# Patient Record
Sex: FEMALE | Race: WHITE | Employment: OTHER | URBAN - NONMETROPOLITAN AREA
[De-identification: names, ages, dates, MRNs, and addresses within clinical notes are randomized per-mention and may not be internally consistent; named-entity substitution may affect disease eponyms.]

---

## 2019-07-30 ENCOUNTER — OFFICE VISIT (OUTPATIENT)
Dept: OTOLARYNGOLOGY | Facility: OTHER | Age: 49
End: 2019-07-30
Attending: OTOLARYNGOLOGY
Payer: COMMERCIAL

## 2019-07-30 ENCOUNTER — TRANSFERRED RECORDS (OUTPATIENT)
Dept: HEALTH INFORMATION MANAGEMENT | Facility: CLINIC | Age: 49
End: 2019-07-30

## 2019-07-30 DIAGNOSIS — J35.01 CHRONIC TONSILLITIS: Primary | ICD-10-CM

## 2019-07-30 PROCEDURE — G0463 HOSPITAL OUTPT CLINIC VISIT: HCPCS

## 2019-08-05 NOTE — PROGRESS NOTES
document embedded image  Patient Name:  Francine Chang  YOB: 1970  MR Number:  [id number]  Acct Number: [id number]    cc: ~    ENT Progress Note    Date: 07/30/19    Visit Reasons: Recheck Throat/Tiinitus Left    HPI  HPI  HPI: Chief complaint:  Chronic pain left tonsil    History  The patient is a 49-year-old female who is here today for follow-up of chronic discomfort in her left tonsil.  She states she occasionally has a sense of foreign body in the tonsil.  She was seen several months ago by Dr. Goode was felt to have some tonsillar asymmetry.    Exam  Oral cavity oropharynx-I cannot perceive any tonsillar asymmetry persisting at this time.  There is no acute inflammation of the tonsils.  Her tonsils are cryptic.  Bimanual exam-there is no palpable mass in either tonsillar fossa  Neck-no masses or adenopathy  Nasal-no obstruction or purulence  Head and neck integument-Clear  Indirect laryngoscopy-Clear hypopharynx and larynx  General-the patient appears well and in no distress  Neuro-there are no focal cranial nerve deficits    Home Medications    No Home Medications  05/16/19 [History Confirmed 07/31/19]    Allergies    Sulfa (Sulfonamide Antibiotics) Allergy (Mild, Verified 07/31/19 09:03)  Rash    PFSH  PFSH  PFSH:   Medical History (Reviewed 07/31/19 @ 09:04 by Michelle Bran CMA)    Headache (Acute)  Tinnitus (Acute)    Surgical History (Reviewed 07/31/19 @ 09:04 by Michelle Bran CMA)    History of kidney stones (Resolved)  2 surgeries  History of oral surgery (Resolved)  wisdom teeth removal      Social History (Reviewed 07/31/19 @ 09:04 by Michelle Bran CMA)  Smoking Status:  Never smoker   second hand exposure:  No   alcohol intake:  never   substance use type:  does not use   seatbelt use:  always   drive intox or ride w/ intox :  No   working smoke detector in home:  Yes   fire extinguisher in home:  Yes   firearms in home:  Yes firearms unloaded and locked:  Yes   do you feel safe at home:  Yes   victim of physical abuse:  No   victim of emotional abuse:  No   victim of sexual abuse:  No   would you like helpful sources:  No        A&P  Assessment & Plan  (1) Chronic tonsillitis:         Code(s):  J35.01 - Chronic tonsillitis        The patient's symptoms have exceeded 3 months.  She is a candidate for tonsil surgery should she wish to proceed.  She is not interested in surgery.  She was reassured that do not have suspicions of a neoplasm at this time.  She will follow up if she has worsening symptoms in the future.      Danny Baptiste MD              07/30/19 1110   <Electronically signed by Danny Baptiste MD> 08/01/19 9925

## 2021-02-18 ENCOUNTER — OFFICE VISIT (OUTPATIENT)
Dept: OTOLARYNGOLOGY | Facility: OTHER | Age: 51
End: 2021-02-18
Attending: OTOLARYNGOLOGY
Payer: COMMERCIAL

## 2021-02-18 VITALS
OXYGEN SATURATION: 98 % | SYSTOLIC BLOOD PRESSURE: 106 MMHG | TEMPERATURE: 97.6 F | BODY MASS INDEX: 25.76 KG/M2 | HEIGHT: 62 IN | WEIGHT: 140 LBS | RESPIRATION RATE: 16 BRPM | HEART RATE: 78 BPM | DIASTOLIC BLOOD PRESSURE: 60 MMHG

## 2021-02-18 DIAGNOSIS — R09.A2 GLOBUS PHARYNGEUS: Primary | ICD-10-CM

## 2021-02-18 DIAGNOSIS — J35.1 TONSILLAR HYPERTROPHY: ICD-10-CM

## 2021-02-18 DIAGNOSIS — E04.9 THYROID GOITER: ICD-10-CM

## 2021-02-18 PROCEDURE — 31575 DIAGNOSTIC LARYNGOSCOPY: CPT | Performed by: OTOLARYNGOLOGY

## 2021-02-18 PROCEDURE — 99203 OFFICE O/P NEW LOW 30 MIN: CPT | Mod: 25 | Performed by: OTOLARYNGOLOGY

## 2021-02-18 SDOH — HEALTH STABILITY: MENTAL HEALTH: HOW OFTEN DO YOU HAVE A DRINK CONTAINING ALCOHOL?: NEVER

## 2021-02-18 SDOH — HEALTH STABILITY: MENTAL HEALTH: HOW OFTEN DO YOU HAVE 6 OR MORE DRINKS ON ONE OCCASION?: NEVER

## 2021-02-18 SDOH — HEALTH STABILITY: MENTAL HEALTH: HOW MANY STANDARD DRINKS CONTAINING ALCOHOL DO YOU HAVE ON A TYPICAL DAY?: NOT ASKED

## 2021-02-18 ASSESSMENT — MIFFLIN-ST. JEOR: SCORE: 1208.29

## 2021-02-18 ASSESSMENT — PAIN SCALES - GENERAL: PAINLEVEL: MILD PAIN (2)

## 2021-02-18 NOTE — PROGRESS NOTES
Otolaryngology Consultation    Patient: Francine Chang  : 1970    Patient presents with:  Consult: Throat Issue, Nasal Sore; Self Referral      HPI:  Francine Chang is a 50 year old female seen today for evaluation of her throat.    She initially had a severe sore throat on the left 2 years ago.  Since that time she has a left lower throat globus described like a sunflower seed being stuck in her throat  She points to the hyoid/thyroihoid area on left    She has also noted a left nasal sore    Associated left pulsatile tinnitus, present since 2016 Lyme dz  Recent post coital migraine no chronic migraine    She has seen Dr. Baptiste in the distant past dated 2019 this note was reviewed she has chronic left tonsil pain and globus she is initially seen in the distant past by Dr. Goode who noted tonsillar asymmetry.  On repeat exam by Dr. Baptiste there is no asymmetry noted cryptic tonsils were found without mass he did discuss tonsillectomy but she did not wish to proceed    No weight loss  No tobacco  Rare gastroesophageal reflux disease  No dysphonia  No dysphagia    She is a      Family hx of thyroid disease, no known family hx of thyroid cancer    She states she has had several prior XR and does not wish to have any radiation exposure with imaging if possible        No current outpatient medications on file.       Allergies: Patient has no known allergies.     No past medical history on file.    No past surgical history on file.    ENT family history reviewed    Social History     Tobacco Use     Smoking status: Never Smoker     Smokeless tobacco: Never Used   Substance Use Topics     Alcohol use: Never     Frequency: Never     Binge frequency: Never     Drug use: None       Review of Systems  ROS: 10 point ROS neg other than the symptoms noted above in the HPI and tinnitus, neck pain    Physical Exam  /60   Pulse 78   Temp 97.6  F (36.4  C) (Tympanic)   Resp 16   Ht  "1.575 m (5' 2\")   Wt 63.5 kg (140 lb)   SpO2 98%   BMI 25.61 kg/m    General - The patient is well nourished and well developed, and appears to have good nutritional status.  Alert and oriented to person and place, answers questions and cooperates with examination appropriately.   Head and Face - Normocephalic and atraumatic, with no gross asymmetry noted.  The facial nerve is intact, with strong symmetric movements.  Voice and Breathing - The patient was breathing comfortably without the use of accessory muscles. There was no wheezing, stridor, or stertor.  The patients voice was clear and strong, and had appropriate pitch and quality.  No lupe peripheral digital clubbing or cyanosis   Ears -The external auditory canals are patent, the tympanic membranes are intact without effusion, retraction or mass.  Bony landmarks are intact.  Eyes - Extraocular movements intact, and the pupils were reactive to light.  Sclera were not icteric or injected, conjunctiva were pink and moist.  Mouth - Examination of the oral cavity showed pink, healthy oral mucosa. No lesions or ulcerations noted.  The tongue was mobile and midline, and the dentition were in good condition.    Throat - The walls of the oropharynx were smooth, pink, moist, symmetric, and had no lesions or ulcerations.  The tonsillar pillars and soft palate were symmetric.  The uvula was midline on elevation.  Grade 3 symmetric tonsils, no asymmetry or mass, tongue base symmetric on palpation  Neck - reproducible globus at left thyrohoid and left hyoid.  No palpable enlarged fixed cervical lymph nodes.  No neck cysts or unusual tenderness to palpation.   No palpable fixed thyroid nodules , bilateral nontender multinodular small goiter.  The trachea is grossly midline.   Nose - External contour is symmetric, no gross deflection or scars.  Nasal mucosa is pink and moist with no abnormal mucus.  Bilateral caudal septum slight crusting. The septum and turbinates were " evaluated: no lupe obstruction.  No polyps, masses, or purulence noted on examination.      Attempts at mirror laryngoscopy were not possible due to gag reflex.  Therefore I proceeded with a fiberoptic examination after informed consent.  First I applied topical nasal lidocaine and neosynephrine.  I then passed the scope through the nasal cavity.     The nasopharynx was mucosally covered and symmetric.  The eustachian tube openings were unobstructed.  Going further down I had a clear view of the base of tongue which had normal appearing grade 2 lingual tonsillar tissue.  The base of tongue was free of lesions, and the vallecula was open.  The epiglottis was smooth and mucosally covered.  The supraglottic larynx was then clearly visualized.  The vocal cords moved smoothly and symmetrically and were pearly white.  The pyriform sinuses were open and without lupe mass or pooling of secretions upon valsalva, and the limited view of the postcricoid region did not show any lesions.  The patient tolerated the procedure well.      Impression and Plan- Francine Chang is a 50 year old female with:    ICD-10-CM    1. 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 Thyroid goiter  E04.9 US Thyroid     US Head Neck Soft Tissue   3. Tonsillar hypertrophy  J35.1        Use Ayr Gel to both nostrils twice daily for 1 month, then at bedtime only  Maintain adequate water intake  Decrease Caffeine  Complete Thyroid and Neck Ultrasounds    I discussed options with chronic globus.  I would like her to have a CT neck if her symptoms persist.  Could also consider amitriptyline and/or superior laryngeal nerve block which was discussed today      There is no indication for surgical intervention at this juncture and she is happy to hear this.  Indications for adenotonsillectomy include recurrent tonsillitis (7 infections in one year, 5 infections/year x 2 years, 3 infections/year for 3 years; or lupe sleep apnea or  sleep disordered breathing causing daytime symptoms).    Education provided, anatomy discussed.    Follow up as needed with progressive symptoms.  I told Francine an USFNAB may be recommended based on US              Craol Garcia D.O.  Otolaryngology/Head and Neck Surgery  Allergy

## 2021-02-18 NOTE — NURSING NOTE
"Chief Complaint   Patient presents with     Consult     Throat Issue, Nasal Sore; Self Referral       Initial /60   Pulse 78   Temp 97.6  F (36.4  C) (Tympanic)   Resp 16   Ht 1.575 m (5' 2\")   Wt 63.5 kg (140 lb)   SpO2 98%   BMI 25.61 kg/m   Estimated body mass index is 25.61 kg/m  as calculated from the following:    Height as of this encounter: 1.575 m (5' 2\").    Weight as of this encounter: 63.5 kg (140 lb).  Medication Reconciliation: complete  Dorothy Holliday LPN  "

## 2021-02-18 NOTE — LETTER
2021         RE: Francine Chang  [address] [facility] [address]        Dear Colleague,    Thank you for referring your patient, Francine Chang, to the [facility]. Please see a copy of my visit note below.      Otolaryngology Consultation    Patient: Francine Chang  : 1970    Patient presents with:  Consult: Throat Issue, Nasal Sore; Self Referral      HPI:  Francine Chang is a 50 year old female seen today for evaluation of her throat.    She initially had a severe sore throat on the left 2 years ago.  Since that time she has a left lower throat globus described like a sunflower seed being stuck in her throat  She points to the hyoid/thyroihoid area on left    She has also noted a left nasal sore    Associated left pulsatile tinnitus, present since  Lyme dz  Recent post coital migraine no chronic migraine    She has seen Dr. Baptiste in the distant past dated 2019 this note was reviewed she has chronic left tonsil pain and globus she is initially seen in the distant past by Dr. Goode who noted tonsillar asymmetry.  On repeat exam by Dr. Baptiste there is no asymmetry noted cryptic tonsils were found without mass he did discuss tonsillectomy but she did not wish to proceed    No weight loss  No tobacco  Rare gastroesophageal reflux disease  No dysphonia  No dysphagia    She is a      Family hx of thyroid disease, no known family hx of thyroid cancer    She states she has had several prior XR and does not wish to have any radiation exposure with imaging if possible        No current outpatient medications on file.       Allergies: Patient has no known allergies.     No past medical history on file.    No past surgical history on file.    ENT family history reviewed    Social History     Tobacco Use     Smoking status: Never Smoker     Smokeless tobacco: Never Used   Substance Use Topics     Alcohol use: Never     Frequency: Never     Binge  "frequency: Never     Drug use: None       Review of Systems  ROS: 10 point ROS neg other than the symptoms noted above in the HPI and tinnitus, neck pain    Physical Exam  /60   Pulse 78   Temp 97.6  F (36.4  C) (Tympanic)   Resp 16   Ht 1.575 m (5' 2\")   Wt 63.5 kg (140 lb)   SpO2 98%   BMI 25.61 kg/m    General - The patient is well nourished and well developed, and appears to have good nutritional status.  Alert and oriented to person and place, answers questions and cooperates with examination appropriately.   Head and Face - Normocephalic and atraumatic, with no gross asymmetry noted.  The facial nerve is intact, with strong symmetric movements.  Voice and Breathing - The patient was breathing comfortably without the use of accessory muscles. There was no wheezing, stridor, or stertor.  The patients voice was clear and strong, and had appropriate pitch and quality.  No lupe peripheral digital clubbing or cyanosis   Ears -The external auditory canals are patent, the tympanic membranes are intact without effusion, retraction or mass.  Bony landmarks are intact.  Eyes - Extraocular movements intact, and the pupils were reactive to light.  Sclera were not icteric or injected, conjunctiva were pink and moist.  Mouth - Examination of the oral cavity showed pink, healthy oral mucosa. No lesions or ulcerations noted.  The tongue was mobile and midline, and the dentition were in good condition.    Throat - The walls of the oropharynx were smooth, pink, moist, symmetric, and had no lesions or ulcerations.  The tonsillar pillars and soft palate were symmetric.  The uvula was midline on elevation.  Grade 3 symmetric tonsils, no asymmetry or mass, tongue base symmetric on palpation  Neck - reproducible globus at left thyrohoid and left hyoid.  No palpable enlarged fixed cervical lymph nodes.  No neck cysts or unusual tenderness to palpation.   No palpable fixed thyroid nodules , bilateral nontender " multinodular small goiter.  The trachea is grossly midline.   Nose - External contour is symmetric, no gross deflection or scars.  Nasal mucosa is pink and moist with no abnormal mucus.  Bilateral caudal septum slight crusting. The septum and turbinates were evaluated: no lupe obstruction.  No polyps, masses, or purulence noted on examination.      Attempts at mirror laryngoscopy were not possible due to gag reflex.  Therefore I proceeded with a fiberoptic examination after informed consent.  First I applied topical nasal lidocaine and neosynephrine.  I then passed the scope through the nasal cavity.     The nasopharynx was mucosally covered and symmetric.  The eustachian tube openings were unobstructed.  Going further down I had a clear view of the base of tongue which had normal appearing grade 2 lingual tonsillar tissue.  The base of tongue was free of lesions, and the vallecula was open.  The epiglottis was smooth and mucosally covered.  The supraglottic larynx was then clearly visualized.  The vocal cords moved smoothly and symmetrically and were pearly white.  The pyriform sinuses were open and without lupe mass or pooling of secretions upon valsalva, and the limited view of the postcricoid region did not show any lesions.  The patient tolerated the procedure well.      Impression and Plan- Francine Chang is a 50 year old female with:    ICD-10-CM    1. 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 Thyroid goiter  E04.9 US Thyroid     US Head Neck Soft Tissue   3. Tonsillar hypertrophy  J35.1        Use Ayr Gel to both nostrils twice daily for 1 month, then at bedtime only  Maintain adequate water intake  Decrease Caffeine  Complete Thyroid and Neck Ultrasounds    I discussed options with chronic globus.  I would like her to have a CT neck if her symptoms persist.  Could also consider amitriptyline and/or superior laryngeal nerve block which was discussed today      There is no  indication for surgical intervention at this juncture and she is happy to hear this.  Indications for adenotonsillectomy include recurrent tonsillitis (7 infections in one year, 5 infections/year x 2 years, 3 infections/year for 3 years; or lupe sleep apnea or sleep disordered breathing causing daytime symptoms).    Education provided, anatomy discussed.    Follow up as needed with progressive symptoms.  I told Francine an USFNAB may be recommended based on US              Carol Garcia D.O.  Otolaryngology/Head and Neck Surgery  Allergy            Again, thank you for allowing me to participate in the care of your patient.        Sincerely,        Carol Garcia MD

## 2021-02-18 NOTE — PATIENT INSTRUCTIONS
Thank you for allowing Dr. Garcia and our ENT team to participate in your care.  If your medications are too expensive, please give the nurse a call.  We can possibly change this medication.  If you have a scheduling or an appointment question please contact our Health Unit Coordinator at their direct line [phone].   ALL nursing questions or concerns can be directed to your ENT nurse at: [phone] - [place]    Use Ayr Gel to both nostrils twice daily for 1 month, then at bedtime only  Maintain adequate water intake  Decrease Caffeine  Complete Thyroid and Neck Ultrasounds

## 2021-03-08 ENCOUNTER — HOSPITAL ENCOUNTER (OUTPATIENT)
Dept: ULTRASOUND IMAGING | Facility: HOSPITAL | Age: 51
End: 2021-03-08
Attending: OTOLARYNGOLOGY
Payer: COMMERCIAL

## 2021-03-08 DIAGNOSIS — E04.9 THYROID GOITER: ICD-10-CM

## 2021-03-08 DIAGNOSIS — R09.A2 GLOBUS PHARYNGEUS: ICD-10-CM

## 2021-03-08 PROCEDURE — 76536 US EXAM OF HEAD AND NECK: CPT
